# Patient Record
Sex: FEMALE | Race: WHITE
[De-identification: names, ages, dates, MRNs, and addresses within clinical notes are randomized per-mention and may not be internally consistent; named-entity substitution may affect disease eponyms.]

---

## 2019-02-12 ENCOUNTER — HOSPITAL ENCOUNTER (INPATIENT)
Dept: HOSPITAL 25 - MCHOBOUT | Age: 39
LOS: 3 days | Discharge: HOME | DRG: 560 | End: 2019-02-15
Attending: MIDWIFE | Admitting: MIDWIFE
Payer: COMMERCIAL

## 2019-02-12 DIAGNOSIS — D64.9: ICD-10-CM

## 2019-02-12 DIAGNOSIS — O40.3XX0: Primary | ICD-10-CM

## 2019-02-12 DIAGNOSIS — Z3A.39: ICD-10-CM

## 2019-02-12 LAB
BASOPHILS # BLD AUTO: 0.1 10^3/UL (ref 0–0.2)
EOSINOPHIL # BLD AUTO: 0.1 10^3/UL (ref 0–0.6)
HCT VFR BLD AUTO: 39 % (ref 35–47)
HGB BLD-MCNC: 13.1 G/DL (ref 12–16)
LYMPHOCYTES # BLD AUTO: 3.1 10^3/UL (ref 1–4.8)
MCH RBC QN AUTO: 31 PG (ref 27–31)
MCHC RBC AUTO-ENTMCNC: 34 G/DL (ref 31–36)
MCV RBC AUTO: 92 FL (ref 80–97)
MONOCYTES # BLD AUTO: 1.1 10^3/UL (ref 0–0.8)
NEUTROPHILS # BLD AUTO: 9.2 10^3/UL (ref 1.5–7.7)
NRBC # BLD AUTO: 0 10^3/UL
NRBC BLD QL AUTO: 0
PLATELET # BLD AUTO: 254 10^3/UL (ref 150–450)
RBC # BLD AUTO: 4.25 10^6/UL (ref 4–5.4)
WBC # BLD AUTO: 13.7 10^3/UL (ref 3.5–10.8)

## 2019-02-12 PROCEDURE — 86901 BLOOD TYPING SEROLOGIC RH(D): CPT

## 2019-02-12 PROCEDURE — 59200 INSERT CERVICAL DILATOR: CPT

## 2019-02-12 PROCEDURE — 36415 COLL VENOUS BLD VENIPUNCTURE: CPT

## 2019-02-12 PROCEDURE — 85025 COMPLETE CBC W/AUTO DIFF WBC: CPT

## 2019-02-12 PROCEDURE — 86850 RBC ANTIBODY SCREEN: CPT

## 2019-02-12 PROCEDURE — 90707 MMR VACCINE SC: CPT

## 2019-02-12 PROCEDURE — 86900 BLOOD TYPING SEROLOGIC ABO: CPT

## 2019-02-12 NOTE — HP
General Information





- Reason for Visit





Patient with polyhydramnios, here for cervical ripening and induction of labor.





- General Information


Maternal Age: 38


Grav: 1


Estimated Due Date: 19


Determined By: LMP


Maternal Blood Type and Rh: O Positive





- Results this Pregnancy


Serology/RPR Result: Non-Reactive


Rubella Result: Non-Immune


HBsAg Result: Negative


HIV Result: Negative


GBS Culture Result: Positive





Past Medical History


Delivery History: See  Records


Delivery History Comment: 





no previous pregnancies


Pertinent Past Medical History: See  Records


Past Medical History Comment: 





None


Pertinent Past Surgical History: See  Records


Past Surgical History Comment: 





Partial splenectomy 


Pertinent Family History: Non-Contributory


Family History Comment: 





TIA


Skin Ca


Stroke


Diabetes


Dementia





- Antepartal Records


Antepartal Records: Reviewed, Pregnancy Complicated by: - Polyhydramnios, fetal 

pyelectasis and unilateral UPJ obstruction





Review of Systems


Constitutional: Comfortable


CV Complaint: No


Respiratory: Shortness of Breath: No


Gastrointestinal: No Nausea/Vomiting, Normal Bowel Movement


Genitourinary: No Dysuria, No Bleeding, No Leaking Fluid


Musculoskeletal: Contractions


Neurological: No Headache, No Visual Changes


Fetal Movement: Normal





Exam


Allergies/Adverse Reactions: 


Allergies





No Known Allergies Allergy (Verified 19 18:27)


 











/76


T 99.1


HR 79


RR 20


O2 99





- Measurements


Height: 5 ft 1 in


Weight: 171 lb


Body Mass Index (BMI): 32.3


Pre-Pregnancy Weight: 130 lb





- Exam


Breast: Breast Exam Deferred


CVA: No CVA Tenderness


Extremities: No Edema


Heart: Normal Rhythm/Heart Sounds


HEENT: No Significant Findings


Lungs: Clear Bilaterally


Rectal: Rectal Exam Deferred


Reflexes: DTR 2+, - - No clonus


Thyroid: - - WNL @ entry to  care





- Abdominal Exam


Abdomen Exam: Non-Tender, Fundal Height Consistent with Dates





- Ultrasound/Biophysical Profile


Ultrasound Status: Not Done





Targeted Exam Findings


See L&D Outpatient Visit Provider Note for Findings: N/A


Estimated Fetal Weight: 8.5lb


Cervical Exam: Fingertip


Effacement: 80%


Station: Floating, Ballotable


Presenting Part: Vertex


Membrane Status: Intact


Bleeding/Discharge: None





EFM Findings





- External Fetal Monitor Findings


Baseline Fetal Heart Rate: 120


External Fetal Monitor Findings: Accelerations Present, No Pattern of Variable 

or Late Decelerations, Variability Moderate


Contractions: Regular, Mild, < 45 Seconds


Contraction Frequency: Q 3-5 min





Assessment/Plan





- Assessment





37 yo  with IUP @ 39+0 weeks gestation for induction of labor. 

Polyhdramnios. IBOW. No evidence fetal metabolic acidemia





- Obstetrical Risk Factors


Obstetrical Risk Factors: GBS Positive





- Plan


Plan: Induction, Cervical Ripening, Admit - Anticipate Vaginal Delivery


Plan Comment: 





PARQ discussion of cervidil for cervical ripening. Patients in agreement. Admit 

to L&D, monitor per protocol. Patient will desire epidural with active labor. 





- Date/Time of Admission


Date of Admission: 19


Time of Admission: 18:24

## 2019-02-13 PROCEDURE — 3E033VJ INTRODUCTION OF OTHER HORMONE INTO PERIPHERAL VEIN, PERCUTANEOUS APPROACH: ICD-10-PCS | Performed by: MIDWIFE

## 2019-02-13 RX ADMIN — IBUPROFEN PRN MG: 600 TABLET, FILM COATED ORAL at 20:43

## 2019-02-13 RX ADMIN — IBUPROFEN PRN MG: 600 TABLET, FILM COATED ORAL at 08:28

## 2019-02-13 RX ADMIN — IBUPROFEN PRN MG: 600 TABLET, FILM COATED ORAL at 14:06

## 2019-02-13 RX ADMIN — DOCUSATE SODIUM SCH MG: 100 CAPSULE, LIQUID FILLED ORAL at 08:27

## 2019-02-13 RX ADMIN — DOCUSATE SODIUM SCH MG: 100 CAPSULE, LIQUID FILLED ORAL at 14:06

## 2019-02-13 RX ADMIN — DOCUSATE SODIUM SCH MG: 100 CAPSULE, LIQUID FILLED ORAL at 20:43

## 2019-02-13 NOTE — PROCNOTE
[facility] OB: Delivery Note





- Delivery


  ** A


Date of Birth: [birth year]


Time of Birth: 02:39


 Sex: Male


[employer] Weight at Birth: 6 lb 9 oz


Apgar Score 1 Minute: 9


Apgar Score 5 Minutes: 9


Gestational Age in Weeks and Days at Delivery: 39 Weeks and 1 Days


Delivery Method: Spontaneous Vaginal


Labor: Spontaneous


Amniotic Fluid: Clear


Anesthesia/Analgesia: None


Anesthesia Comment: attempted epidural, pt complete before placement


Delivered By: Yong Canela





- Nursery


Level of Nursery: Regular/Bedside





- Perineum


Perineal Injury: None/Intact





- Events


Delivery Events of Note: Pitocin Only After Delivery, Full Course of Antibiotics





- Additional Delivery Notes


Additional Delivery Notes: 





Patient admitted for IOL for polyhdramnios. Cervidil lead to rupture of 

membranes and active labor. LOL 7'1", pushed 2 hours 21 min. Baby born @ 0239 OA

-NATALIE with double nuchal cord. First loop unwrapped on perineum, second 

delivered through and unwrapped. Delivered to maternal abdomen with spontaneous 

cry, Apgars 9, 9. Long spiral cord doubly clamped and cut by FOB once 

pulsations ceased, about 5 min. Placenta delivered with gentle cord traction @ 

0246.  Fundus firm with pitocin infusing. Perineum intact. EBL 350ml. Baby at 

breast to initate breastfeeding. Mother and baby stable. Baby name Rober Petit

, weight 6lb9oz.

## 2019-02-14 LAB
BASOPHILS # BLD AUTO: 0.1 10^3/UL (ref 0–0.2)
EOSINOPHIL # BLD AUTO: 0.3 10^3/UL (ref 0–0.6)
HCT VFR BLD AUTO: 30 % (ref 35–47)
HGB BLD-MCNC: 9.9 G/DL (ref 12–16)
LYMPHOCYTES # BLD AUTO: 3.5 10^3/UL (ref 1–4.8)
MCH RBC QN AUTO: 31 PG (ref 27–31)
MCHC RBC AUTO-ENTMCNC: 33 G/DL (ref 31–36)
MCV RBC AUTO: 94 FL (ref 80–97)
MONOCYTES # BLD AUTO: 0.8 10^3/UL (ref 0–0.8)
NEUTROPHILS # BLD AUTO: 10.8 10^3/UL (ref 1.5–7.7)
NRBC # BLD AUTO: 0 10^3/UL
NRBC BLD QL AUTO: 0
PLATELET # BLD AUTO: 216 10^3/UL (ref 150–450)
RBC # BLD AUTO: 3.21 10^6/UL (ref 4–5.4)
WBC # BLD AUTO: 15.4 10^3/UL (ref 3.5–10.8)

## 2019-02-14 RX ADMIN — Medication SCH MG: at 21:26

## 2019-02-14 RX ADMIN — DOCUSATE SODIUM SCH MG: 100 CAPSULE, LIQUID FILLED ORAL at 09:04

## 2019-02-14 RX ADMIN — IBUPROFEN PRN MG: 600 TABLET, FILM COATED ORAL at 21:26

## 2019-02-14 RX ADMIN — IBUPROFEN PRN MG: 600 TABLET, FILM COATED ORAL at 15:16

## 2019-02-14 RX ADMIN — Medication SCH MG: at 09:04

## 2019-02-14 RX ADMIN — DOCUSATE SODIUM SCH MG: 100 CAPSULE, LIQUID FILLED ORAL at 15:16

## 2019-02-14 RX ADMIN — DOCUSATE SODIUM SCH MG: 100 CAPSULE, LIQUID FILLED ORAL at 21:26

## 2019-02-14 RX ADMIN — IBUPROFEN PRN MG: 600 TABLET, FILM COATED ORAL at 09:04

## 2019-02-15 VITALS — SYSTOLIC BLOOD PRESSURE: 103 MMHG | DIASTOLIC BLOOD PRESSURE: 66 MMHG

## 2019-02-15 RX ADMIN — Medication SCH MG: at 08:50

## 2019-02-15 RX ADMIN — DOCUSATE SODIUM SCH MG: 100 CAPSULE, LIQUID FILLED ORAL at 08:55

## 2019-02-15 RX ADMIN — IBUPROFEN PRN MG: 600 TABLET, FILM COATED ORAL at 08:55

## 2019-02-15 NOTE — PTEDU
Patient Name: ELIZABETH HAMILTON

MRN: [id number]



ELIZABETH HAMILTON selected video: Never Ever Shake a Baby to view on 02/15/2019 at 1:11:19 PM from [id number]
[id number]

## 2019-02-22 ENCOUNTER — HOSPITAL ENCOUNTER (EMERGENCY)
Dept: HOSPITAL 25 - ED | Age: 39
LOS: 1 days | Discharge: HOME | End: 2019-02-23
Payer: COMMERCIAL

## 2019-02-22 DIAGNOSIS — N93.9: ICD-10-CM

## 2019-02-22 DIAGNOSIS — R10.9: Primary | ICD-10-CM

## 2019-02-22 DIAGNOSIS — R50.9: ICD-10-CM

## 2019-02-22 LAB
ALBUMIN SERPL BCG-MCNC: 3.3 G/DL (ref 3.2–5.2)
ALBUMIN/GLOB SERPL: 1.1 {RATIO} (ref 1–3)
ALP SERPL-CCNC: 63 U/L (ref 34–104)
ALT SERPL W P-5'-P-CCNC: 23 U/L (ref 7–52)
ANION GAP SERPL CALC-SCNC: 9 MMOL/L (ref 2–11)
AST SERPL-CCNC: 17 U/L (ref 13–39)
BASOPHILS # BLD AUTO: 0.1 10^3/UL (ref 0–0.2)
BUN SERPL-MCNC: 12 MG/DL (ref 6–24)
BUN/CREAT SERPL: 19.4 (ref 8–20)
CALCIUM SERPL-MCNC: 9.2 MG/DL (ref 8.6–10.3)
CHLORIDE SERPL-SCNC: 105 MMOL/L (ref 101–111)
EOSINOPHIL # BLD AUTO: 0.2 10^3/UL (ref 0–0.6)
GLOBULIN SER CALC-MCNC: 3.1 G/DL (ref 2–4)
GLUCOSE SERPL-MCNC: 103 MG/DL (ref 70–100)
HCO3 SERPL-SCNC: 23 MMOL/L (ref 22–32)
HCT VFR BLD AUTO: 37 % (ref 35–47)
HGB BLD-MCNC: 12.2 G/DL (ref 12–16)
LYMPHOCYTES # BLD AUTO: 2 10^3/UL (ref 1–4.8)
MCH RBC QN AUTO: 31 PG (ref 27–31)
MCHC RBC AUTO-ENTMCNC: 33 G/DL (ref 31–36)
MCV RBC AUTO: 93 FL (ref 80–97)
MONOCYTES # BLD AUTO: 0.6 10^3/UL (ref 0–0.8)
NEUTROPHILS # BLD AUTO: 7.5 10^3/UL (ref 1.5–7.7)
NRBC # BLD AUTO: 0 10^3/UL
NRBC BLD QL AUTO: 0
PLATELET # BLD AUTO: 357 10^3/UL (ref 150–450)
POTASSIUM SERPL-SCNC: 3.3 MMOL/L (ref 3.5–5)
PROT SERPL-MCNC: 6.4 G/DL (ref 6.4–8.9)
RBC # BLD AUTO: 3.93 10^6/UL (ref 4–5.4)
RBC UR QL AUTO: (no result)
SODIUM SERPL-SCNC: 137 MMOL/L (ref 135–145)
WBC # BLD AUTO: 10.4 10^3/UL (ref 3.5–10.8)
WBC UR QL AUTO: (no result)

## 2019-02-22 PROCEDURE — 80053 COMPREHEN METABOLIC PANEL: CPT

## 2019-02-22 PROCEDURE — 83605 ASSAY OF LACTIC ACID: CPT

## 2019-02-22 PROCEDURE — 76705 ECHO EXAM OF ABDOMEN: CPT

## 2019-02-22 PROCEDURE — 74177 CT ABD & PELVIS W/CONTRAST: CPT

## 2019-02-22 PROCEDURE — 87040 BLOOD CULTURE FOR BACTERIA: CPT

## 2019-02-22 PROCEDURE — 81003 URINALYSIS AUTO W/O SCOPE: CPT

## 2019-02-22 PROCEDURE — 36415 COLL VENOUS BLD VENIPUNCTURE: CPT

## 2019-02-22 PROCEDURE — 99283 EMERGENCY DEPT VISIT LOW MDM: CPT

## 2019-02-22 PROCEDURE — 81015 MICROSCOPIC EXAM OF URINE: CPT

## 2019-02-22 PROCEDURE — 86140 C-REACTIVE PROTEIN: CPT

## 2019-02-22 PROCEDURE — 87086 URINE CULTURE/COLONY COUNT: CPT

## 2019-02-22 PROCEDURE — 85025 COMPLETE CBC W/AUTO DIFF WBC: CPT

## 2019-02-22 PROCEDURE — 76856 US EXAM PELVIC COMPLETE: CPT

## 2019-02-22 PROCEDURE — 83690 ASSAY OF LIPASE: CPT

## 2019-02-22 NOTE — ED
GI/ HPI





- HPI Summary


HPI Summary: 





A 39 y/o female presents to [facility] with a chief complaint of vaginal bleeding 

since 02/13/19. She reports that she gave birth to her first child on 02/12/19. 

She claims that her vaginal bleeding came down in intensity but peaked back up 

on 02/13/19. She claims that she has been going through 1 pad every 3 hours. 

She called her doctor who claims that she should pay attention to fevers. When 

she had a temperature of 101 at 13:30 02/22/19 she decided to come to the ED. 

She claims that she took advil and her temperature went down to 98.9 at triage 

in the ED. She also reports abdominal cramping. At triage she rated her pain as 

a 4/10 in severity. She denies cramping with breast feeding. She denies red 

streaks around her nipples. She denies SOB, CP, Swelling in legs, erythema, 

cough, dysuria or hematuria. She reports that on 02/21/19 she was lightheaded 

but that has resolved on 02/22/19. She reports that she still has an appendix. 

She sees [facility] of [place]. Vital signs while in room  HR: 79 bpm, O2 Sat: 98, BP

: 101/76.





- History of Current Complaint


Chief Complaint: EDOBProblems


Time Seen by Provider: 02/22/19 19:35


Stated Complaint: EXCESSIVE BLEEDING/ABD PAIN


Hx Obtained From: Patient


Onset/Duration: Started Days Ago, Still Present


Timing: Constant, Lasting Days


Severity: Moderate


Current Severity: Moderate


Pain Intensity: 4 - out of 10


Location of Pain: Diffuse


Pain Characteristics: Cramping


Associated Signs and Symptoms: Positive: Fever - PTA.  Negative: Hematuria, 

Dysuria, Lightheadedness, Cough, Chest Pain





- Allergy/Home Medications


Allergies/Adverse Reactions: 


 Allergies











Allergy/AdvReac Type Severity Reaction Status Date / Time


 


No Known Allergies Allergy   Verified 02/22/19 16:58














PMH/Surg Hx/FS Hx/Imm Hx


Endocrine/Hematology History: 


   Denies: Hx Diabetes


Cardiovascular History: 


   Denies: Hx Hypertension


Sensory History: Reports: Hx Contacts or Glasses





- Surgical History


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


Infectious Disease History: No


Infectious Disease History: 


   Denies: Traveled Outside the [place] in Last 30 Days





- Family History


Known Family History: Positive: Diabetes - grandmother type II


   Negative: Hypertension





- Social History


Alcohol Use: None


Substance Use Type: Reports: None


Smoking Status (MU): Former Smoker


Type: Cigarettes


Amount Used/How Often: 3/qd





Review of Systems


Positive: Fever - 101 PTA, 98.9 at triage


Negative: Chest Pain


Negative: Shortness Of Breath, Cough


Positive: Abdominal Pain


Genitourinary: Other - Positive: vaginal bleeding


Negative: dysuria, hematuria


Musculoskeletal: Negative - leg swelling


Negative: Edema


Negative: Rash


Neurological: Negative - lightheadedness


All Other Systems Reviewed And Are Negative: Yes





Physical Exam





- Summary


Physical Exam Summary: 





Constitutional: Well-developed, Well-nourished, Alert. (-) Distressed


Skin: Warm, Dry


HENT: Normocephalic; Atraumatic


Eyes: Conjunctiva normal


Neck: Musculoskeletal ROM normal neck. (-) JVD, (-) Stridor, (-) Tracheal 

deviation


Cardio: Rhythm regular, rate normal, Heart sounds normal; Intact distal pulses; 

(-) Murmur


Pulmonary/Chest wall: Effort normal. (-) Respiratory distress, (-) Wheezes, (-) 

Rales


Abd: RLQ tenderness, mild LLQ suprapubic tenderness, moderate RLQ tenderness, 

no rebound, mild guarding over McBurney's and RLQ. No CVA tenderness


Musculoskeletal: (-) Edema


Lymph: (-) Cervical adenopathy


Neuro: Alert, Oriented x3


Psych: Mood and affect Normal


Triage Information Reviewed: Yes


Vital Signs On Initial Exam: 


 Initial Vitals











Temp Pulse Resp BP Pulse Ox


 


 98.9 F   90   18   92/65   96 


 


 02/22/19 16:52  02/22/19 16:52  02/22/19 16:52  02/22/19 16:52  02/22/19 16:52











Vital Signs Reviewed: Yes





Diagnostics





- Vital Signs


 Vital Signs











  Temp Pulse Resp BP Pulse Ox


 


 02/22/19 18:03  99.4 F  78  16  99/69  98


 


 02/22/19 16:52  98.9 F  90  18  92/65  96














- Laboratory


Result Diagrams: 


 02/22/19 20:10





 02/22/19 20:10


Lab Statement: Any lab studies that have been ordered have been reviewed, and 

results considered in the medical decision making process.





- Ultrasound


  ** No standard instances


Ultrasound Interpretation Completed By: Radiologist


Summary of Ultrasound Findings: Appendix Ultrasound impression:  1. No specific 

right lower quadrant abnormality is seen.  2. The appendix is not identified 

and appendicitis is not excluded.  ED physician has reviewed this imaging 

report.  Pelvis ultrasound impression:  1. Mildly enlarged uterus with specific 

fibroid identified measuring 15 mm.  2. Thickened hypoechoic and complex 

endometrium measuring 2.1 cm in thickness.  which may reflect complex fluid or 

blood.  3. Otherwise negative pelvic sonogram.  ED physician has reviewed this 

imaging report.





Re-Evaluation





- Re-Evaluation


  ** First Eval


Re-Evaluation Time: 22:25


Change: Unchanged


Comment: Still has RLQ tenderness





GIGU Course/Dx





- Course


Course Of Treatment: A 39 y/o female presents to [facility] with a chief complaint 

of vaginal bleeding since 0213/19. She reports that she gave birth to her first 

child on 02/12/19. The physical exam revealed RLQ tenderness, mild LLQ 

suprapubic tenderness, moderate RLQ tenderness, no rebound, mild guarding over 

MBburney's and no CVA tenderness.Appendix Ultrasound impression:  1. No 

specific right lower quadrant abnormality is seen.  2. The appendix is not 

identified and appendicitis is not excluded.  Pelvis ultrasound impression:  1. 

Mildly enlarged uterus with specific fibroid identified measuring 15 mm.  2. 

Thickened hypoechoic and complex endometrium measuring 2.1 cm in thickness.  

which may reflect complex fluid or blood.  3. Otherwise negative pelvic 

sonogram.  Bloodwork, Chemistries and Urines obtained. Urine Blood 3+ and Urine 

bacteria 1+ at 19:30 12/22/19. Case discussed with Dr. Gaston, OB, who said 

given ultrasound results signs are unlikely that the patient has a serious OB 

issue, but she can follow up with OB as an outpatient if she continues to 

experience this issue.





- Diagnoses


Differential Diagnoses - Female: Appendicitis, Urinary Tract Infection - 

endometritis


Provider Diagnoses: 


 Abdominal pain








- Physician Notifications


Discussed Care Of Patient With: Sherice Gaston


Time Discussed With Above Provider: 22:50


Instructed by Provider To: Other - Given ultrasound results signs are unlikely 

that the patient has a serious OB issue, but she can follow up with OB as an 

outpatient if she continues to experience this issue.





Discharge





- Sign-Out/Discharge


Documenting (check all that apply): Sign-Out Patient


Signing out patient TO: Devon Melton - pending CT abd/pelvis


Patient Received Moderate/Deep Sedation with Procedure: No





- Discharge Plan


Condition: Good


Referrals: 


Law,Bret, MD [Primary Care Provider] - 





- Billing Disposition and Condition


Condition: GOOD





- Attestation Statements


Document Initiated by Phongiborlando: Yes


Documenting Scribe: Mj Gamboa


Provider For Whom Kayla is Documenting (Include Credential): Marybeth Aponte MD


Scribe Attestation: 


I, Mj Gamboa, scribed for Marybeth Pinto MD on 02/22/19 at 

2350. 


Scribe Documentation Reviewed: Yes


Provider Attestation: 


The documentation as recorded by the scribe, Mj Gamboa accurately 

reflects the service I personally performed and the decisions made by me, 

Marybeth Pinto MD


Status of Scribe Document: Viewed

## 2019-02-23 VITALS — SYSTOLIC BLOOD PRESSURE: 120 MMHG | DIASTOLIC BLOOD PRESSURE: 90 MMHG

## 2019-02-23 NOTE — ED
Progress





- Progress Note


Progress Note: 


This patient was signed out from Dr. Pinto to Dr. Melton at 00:00 02 /23/19 pending CT abdomen/pelvis.





CT ABDOMEN/PELVIS IMPRESSION: 


1. Enlarged uterus consistent with postpartum state with small 16mm exophytic 


fibroid. 


2. Metallic density at the posterior aspect of the splenic tip of uncertain 


etiology. 


3. Otherwise negative CT abdomen/pelvis. A normal small appendix is seen. 


ED physician has reviewed this imaging report.





Upon re-eval the patient feels improved.


The patient will be discharged. She is agreeable with this plan.





Re-Evaluation





- Re-Evaluation


  ** First Eval


Re-Evaluation Time: 00:40


Change: Improved


Comment: Patient is feeling better





Course/Dx





- Course


Course Of Treatment: This patient was signed out from Dr. Pinto to 

Dr. Melton at 00:00 02/23/19 pending CT abdomen/pelvis.  CT ABDOMEN/PELVIS 

IMPRESSION:  1. Enlarged uterus consistent with postpartum state with small 

16mm exophytic.  fibroid.  2. Metallic density at the posterior aspect of the 

splenic tip of uncertain.  etiology.  3. Otherwise negative CT abdomen/pelvis. 

A normal small appendix is seen.  ED physician has reviewed this imaging 

report.  Upon re-eval the patient feels improved.  The patient will be 

discharged. She is agreeable with this plan.





- Diagnoses


Provider Diagnoses: 


 Abdominal pain








- Provider Notifications


Time Discussed With Above Provider: 22:50


Instructed by Provider To: Other - Given ultrasound results signs are unlikely 

that the patient has a serious OB issue, but she can follow up with OB as an 

outpatient if she continues to experience this issue.





Discharge





- Sign-Out/Discharge


Documenting (check all that apply): Patient Departure - DC


Patient Received Moderate/Deep Sedation with Procedure: No





- Discharge Plan


Condition: Good


Disposition: HOME


Patient Education Materials:  Acute Abdominal Pain (ED)


Referrals: 


Bret García MD [Primary Care Provider] - 


Additional Instructions: 


The tests we did tonight did not show any significant infection or other post 

partum complication, so the exact diagnosis is still unclear. Pay attention to 

your symptoms and if you start to feel worse we should see you back here over 

the weekend. Alternatively you can always call the covering obstetrician if you 

are unsure how to proceed.





- Billing Disposition and Condition


Condition: GOOD


Disposition: Home





- Attestation Statements


Document Initiated by Scribe: Yes


Documenting Scribe: Mj Gamboa


Provider For Whom Scribe is Documenting (Include Credential): Devon Melton MD


Scribe Attestation: 


I, Mj Gamboa, scribed for Devon Melton MD on 02/25/19 at 1551. 


Scribe Documentation Reviewed: Yes


Provider Attestation: 


The documentation as recorded by the Mj mehta accurately 

reflects the service I personally performed and the decisions made by me, 

Devon Melton MD


Status of Scribe Document: Viewed

## 2019-06-15 ENCOUNTER — HOSPITAL ENCOUNTER (EMERGENCY)
Dept: HOSPITAL 25 - UCEAST | Age: 39
Discharge: HOME | End: 2019-06-15
Payer: COMMERCIAL

## 2019-06-15 VITALS — DIASTOLIC BLOOD PRESSURE: 67 MMHG | SYSTOLIC BLOOD PRESSURE: 96 MMHG

## 2019-06-15 DIAGNOSIS — N61.0: Primary | ICD-10-CM

## 2019-06-15 DIAGNOSIS — Z87.891: ICD-10-CM

## 2019-06-15 PROCEDURE — G0463 HOSPITAL OUTPT CLINIC VISIT: HCPCS

## 2019-06-15 PROCEDURE — 99212 OFFICE O/P EST SF 10 MIN: CPT

## 2019-06-15 NOTE — UC
General HPI





- HPI Summary


HPI Summary: 





RN notes - 


pt c/o rt breast pain. pt had a temp max 102 yesterday. pt slept all day 

yesterday and did not feels well. pt noticed redness to rt breast and 

tenderness. 








Pleasant 39 yo female c/o progressive R breast pain, redness, tenderness over 

the last several days.  + fever last night 102F.  Has been taking nsaid / 

antipyretic with some relief but redness progressed, prompting visit here.  No 

other rash / soreness / redness.  No GI c/o's.  No cp / sob. No c/o gu issues.  

Reports that she has had problems with the R breast on and off (discomfort, 

nursing) for several months.  Still breast feeding via pump and bottle.  + 

lactating - normal white color.  No blood, green / yellow discoloration.














- History of Current Complaint


Chief Complaint: UCStheresa


Stated Complaint: BREAST COMPLAINT


Time Seen by Provider: 06/15/19 08:42


Hx Obtained From: Patient


Pain Intensity: 5





- Allergy/Home Medications


Allergies/Adverse Reactions: 


 Allergies











Allergy/AdvReac Type Severity Reaction Status Date / Time


 


No Known Allergies Allergy   Verified 06/15/19 08:25











Home Medications: 


 Home Medications





Acetaminophen [Mapap] 1,000 mg PO 06/15/19 [History]











PMH/Surg Hx/FS Hx/Imm Hx


Previously Healthy: Yes





- Surgical History


Surgical History: Yes


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





- Family History


Known Family History: Positive: Diabetes - grandmother type II


   Negative: Hypertension





- Social History


Alcohol Use: Occasionally


Substance Use Type: None


Smoking Status (MU): Former Smoker


Type: Cigarettes


Amount Used/How Often: 3/qd





- Immunization History


Most Recent Influenza Vaccination: 10/22/18


Most Recent Pneumonia Vaccination: never





Review of Systems


All Other Systems Reviewed And Are Negative: Yes


Constitutional: Positive: Fever


Skin: Positive: Other - see hpi


Eyes: Positive: Negative


ENT: Positive: Negative


Respiratory: Positive: Negative


Cardiovascular: Positive: Negative


Gastrointestinal: Positive: Negative


Genitourinary: Positive: Negative


Motor: Positive: Negative


Neurovascular: Positive: Negative


Musculoskeletal: Positive: Negative


Neurological: Positive: Negative


Psychological: Positive: Negative


Is Patient Immunocompromised?: No





Physical Exam


Triage Information Reviewed: Yes


Appearance: Well-Appearing, Well-Nourished


Vital Signs: 


 Initial Vital Signs











Temp  97.8 F   06/15/19 08:19


 


Pulse  83   06/15/19 08:19


 


Resp  18   06/15/19 08:19


 


BP  96/67   06/15/19 08:19


 


Pulse Ox  99   06/15/19 08:19











Vital Signs Reviewed: Yes


Eye Exam: Normal


ENT Exam: Normal


Neck exam: Normal


Neck: Positive: Supple


Respiratory Exam: Normal


Respiratory: Positive: Chest non-tender, Lungs clear, Normal breath sounds, No 

respiratory distress, No accessory muscle use


Abdominal Exam: Normal


Abdomen Description: Positive: Nontender - grossly benign, sitting up


Musculoskeletal Exam: Normal


Neurological Exam: Normal - grossly nonfocal


Psychological Exam: Normal - conversing easily and appropriately.  nad.


Skin Exam: Other - nondiaphoretic.  R lateral breast with large red area approx 

12 cm x 8cm.  + overly warm to touch.  Fades rather than straight demarcation.  

Mildly blanching.  + tender to pressure.  No axill adenopathy appreciated.





Course/Dx





- Course


Course Of Treatment: 





Reviewed coa / tx plan. Important to have close f/u with obgyn.  


Go to the ED if worse or new problems. 


See Avs. 





Questions as posed answered to the best of my abiity. 





S will review immunization statuses with ObGyn and PCP. 





- Diagnoses


Provider Diagnosis: 


 Mastitis, Cellulitis








Discharge





- Sign-Out/Discharge


Documenting (check all that apply): Patient Departure


All imaging exams completed and their final reports reviewed: No Studies





- Discharge Plan


Condition: Stable


Disposition: HOME


Prescriptions: 


Amoxicillin/Clavulanate TAB* [Augmentin *] 875 mg PO BID #20 tab


Patient Education Materials:  Mastitis (ED), Cellulitis (ED)


Referrals: 


Bret García MD [Primary Care Provider] - 


Additional Instructions: 


Follow up with your ObGyn doctor on Monday.  Call Monday morning to see if you 

can get a same day appointment. 





Please go to the Emergency Department for any worse or new problems. 





Warm (not hot) packs as needed.  Avoid astringents.  





Pump and "dump" breast milk from Right breast.  Use separate tubing / cup for 

pumping R breast.  





Yogurt and / or probiotic daily while taking antibiotic, and then for at least 

7 days thereafter. 











- Billing Disposition and Condition


Condition: STABLE


Disposition: Home